# Patient Record
Sex: MALE | Race: WHITE | NOT HISPANIC OR LATINO | Employment: FULL TIME | URBAN - METROPOLITAN AREA
[De-identification: names, ages, dates, MRNs, and addresses within clinical notes are randomized per-mention and may not be internally consistent; named-entity substitution may affect disease eponyms.]

---

## 2018-02-20 ENCOUNTER — APPOINTMENT (OUTPATIENT)
Dept: RADIOLOGY | Facility: MEDICAL CENTER | Age: 63
DRG: 354 | End: 2018-02-20
Attending: EMERGENCY MEDICINE
Payer: COMMERCIAL

## 2018-02-20 ENCOUNTER — HOSPITAL ENCOUNTER (INPATIENT)
Facility: MEDICAL CENTER | Age: 63
LOS: 3 days | DRG: 354 | End: 2018-02-23
Attending: EMERGENCY MEDICINE | Admitting: SURGERY
Payer: COMMERCIAL

## 2018-02-20 DIAGNOSIS — K56.691 OTHER COMPLETE INTESTINAL OBSTRUCTION (HCC): Primary | ICD-10-CM

## 2018-02-20 PROBLEM — K56.609 BOWEL OBSTRUCTION (HCC): Status: ACTIVE | Noted: 2018-02-20

## 2018-02-20 LAB
ALBUMIN SERPL BCP-MCNC: 4.8 G/DL (ref 3.2–4.9)
ALBUMIN/GLOB SERPL: 1.6 G/DL
ALP SERPL-CCNC: 53 U/L (ref 30–99)
ALT SERPL-CCNC: 39 U/L (ref 2–50)
ANION GAP SERPL CALC-SCNC: 14 MMOL/L (ref 0–11.9)
APPEARANCE UR: CLEAR
AST SERPL-CCNC: 30 U/L (ref 12–45)
BASOPHILS # BLD AUTO: 0.5 % (ref 0–1.8)
BASOPHILS # BLD: 0.04 K/UL (ref 0–0.12)
BILIRUB SERPL-MCNC: 0.8 MG/DL (ref 0.1–1.5)
BUN SERPL-MCNC: 25 MG/DL (ref 8–22)
CALCIUM SERPL-MCNC: 10 MG/DL (ref 8.5–10.5)
CHLORIDE SERPL-SCNC: 99 MMOL/L (ref 96–112)
CO2 SERPL-SCNC: 22 MMOL/L (ref 20–33)
COLOR UR AUTO: YELLOW
CREAT SERPL-MCNC: 0.84 MG/DL (ref 0.5–1.4)
EOSINOPHIL # BLD AUTO: 0.06 K/UL (ref 0–0.51)
EOSINOPHIL NFR BLD: 0.8 % (ref 0–6.9)
ERYTHROCYTE [DISTWIDTH] IN BLOOD BY AUTOMATED COUNT: 39.6 FL (ref 35.9–50)
GLOBULIN SER CALC-MCNC: 3 G/DL (ref 1.9–3.5)
GLUCOSE BLD-MCNC: 112 MG/DL (ref 65–99)
GLUCOSE BLD-MCNC: 138 MG/DL (ref 65–99)
GLUCOSE SERPL-MCNC: 220 MG/DL (ref 65–99)
GLUCOSE UR QL STRIP.AUTO: NEGATIVE MG/DL
HCT VFR BLD AUTO: 45 % (ref 42–52)
HGB BLD-MCNC: 16 G/DL (ref 14–18)
IMM GRANULOCYTES # BLD AUTO: 0.04 K/UL (ref 0–0.11)
IMM GRANULOCYTES NFR BLD AUTO: 0.5 % (ref 0–0.9)
INR PPP: 0.95 (ref 0.87–1.13)
KETONES UR QL STRIP.AUTO: ABNORMAL MG/DL
LACTATE BLD-SCNC: 3.6 MMOL/L (ref 0.5–2)
LEUKOCYTE ESTERASE UR QL STRIP.AUTO: NEGATIVE
LIPASE SERPL-CCNC: 13 U/L (ref 11–82)
LYMPHOCYTES # BLD AUTO: 1.89 K/UL (ref 1–4.8)
LYMPHOCYTES NFR BLD: 24.8 % (ref 22–41)
MCH RBC QN AUTO: 30.5 PG (ref 27–33)
MCHC RBC AUTO-ENTMCNC: 35.6 G/DL (ref 33.7–35.3)
MCV RBC AUTO: 85.9 FL (ref 81.4–97.8)
MONOCYTES # BLD AUTO: 0.47 K/UL (ref 0–0.85)
MONOCYTES NFR BLD AUTO: 6.2 % (ref 0–13.4)
NEUTROPHILS # BLD AUTO: 5.12 K/UL (ref 1.82–7.42)
NEUTROPHILS NFR BLD: 67.2 % (ref 44–72)
NITRITE UR QL STRIP.AUTO: NEGATIVE
NRBC # BLD AUTO: 0 K/UL
NRBC BLD-RTO: 0 /100 WBC
PH UR STRIP.AUTO: 6 [PH]
PLATELET # BLD AUTO: 208 K/UL (ref 164–446)
PMV BLD AUTO: 10.9 FL (ref 9–12.9)
POTASSIUM SERPL-SCNC: 4 MMOL/L (ref 3.6–5.5)
PROT SERPL-MCNC: 7.8 G/DL (ref 6–8.2)
PROT UR QL STRIP: 30 MG/DL
PROTHROMBIN TIME: 12.4 SEC (ref 12–14.6)
RBC # BLD AUTO: 5.24 M/UL (ref 4.7–6.1)
RBC UR QL AUTO: NEGATIVE
SODIUM SERPL-SCNC: 135 MMOL/L (ref 135–145)
SP GR UR: 1.01
WBC # BLD AUTO: 7.6 K/UL (ref 4.8–10.8)

## 2018-02-20 PROCEDURE — 700101 HCHG RX REV CODE 250: Performed by: SURGERY

## 2018-02-20 PROCEDURE — 501445 HCHG STAPLER, SKIN DISP: Performed by: SURGERY

## 2018-02-20 PROCEDURE — 160009 HCHG ANES TIME/MIN: Performed by: SURGERY

## 2018-02-20 PROCEDURE — A4450 NON-WATERPROOF TAPE: HCPCS | Performed by: SURGERY

## 2018-02-20 PROCEDURE — 502704 HCHG DEVICE, LIGASURE IMPACT: Performed by: SURGERY

## 2018-02-20 PROCEDURE — 160027 HCHG SURGERY MINUTES - 1ST 30 MINS LEVEL 2: Performed by: SURGERY

## 2018-02-20 PROCEDURE — 700105 HCHG RX REV CODE 258: Performed by: EMERGENCY MEDICINE

## 2018-02-20 PROCEDURE — 81002 URINALYSIS NONAUTO W/O SCOPE: CPT

## 2018-02-20 PROCEDURE — A4314 CATH W/DRAINAGE 2-WAY LATEX: HCPCS | Performed by: SURGERY

## 2018-02-20 PROCEDURE — 700101 HCHG RX REV CODE 250

## 2018-02-20 PROCEDURE — 770006 HCHG ROOM/CARE - MED/SURG/GYN SEMI*

## 2018-02-20 PROCEDURE — 83690 ASSAY OF LIPASE: CPT

## 2018-02-20 PROCEDURE — 96361 HYDRATE IV INFUSION ADD-ON: CPT

## 2018-02-20 PROCEDURE — 700102 HCHG RX REV CODE 250 W/ 637 OVERRIDE(OP)

## 2018-02-20 PROCEDURE — 99291 CRITICAL CARE FIRST HOUR: CPT

## 2018-02-20 PROCEDURE — 85610 PROTHROMBIN TIME: CPT

## 2018-02-20 PROCEDURE — 74177 CT ABD & PELVIS W/CONTRAST: CPT

## 2018-02-20 PROCEDURE — 160003 HCHG RECOVERY MINUTES (EXTENDED) STAT: Performed by: SURGERY

## 2018-02-20 PROCEDURE — 160048 HCHG OR STATISTICAL LEVEL 1-5: Performed by: SURGERY

## 2018-02-20 PROCEDURE — 36415 COLL VENOUS BLD VENIPUNCTURE: CPT

## 2018-02-20 PROCEDURE — 501838 HCHG SUTURE GENERAL: Performed by: SURGERY

## 2018-02-20 PROCEDURE — 160038 HCHG SURGERY MINUTES - EA ADDL 1 MIN LEVEL 2: Performed by: SURGERY

## 2018-02-20 PROCEDURE — 87040 BLOOD CULTURE FOR BACTERIA: CPT | Mod: 91

## 2018-02-20 PROCEDURE — 94760 N-INVAS EAR/PLS OXIMETRY 1: CPT

## 2018-02-20 PROCEDURE — 0WQF0ZZ REPAIR ABDOMINAL WALL, OPEN APPROACH: ICD-10-PCS | Performed by: SURGERY

## 2018-02-20 PROCEDURE — 700102 HCHG RX REV CODE 250 W/ 637 OVERRIDE(OP): Performed by: SURGERY

## 2018-02-20 PROCEDURE — 96374 THER/PROPH/DIAG INJ IV PUSH: CPT

## 2018-02-20 PROCEDURE — 160035 HCHG PACU - 1ST 60 MINS PHASE I: Performed by: SURGERY

## 2018-02-20 PROCEDURE — 160036 HCHG PACU - EA ADDL 30 MINS PHASE I: Performed by: SURGERY

## 2018-02-20 PROCEDURE — 96375 TX/PRO/DX INJ NEW DRUG ADDON: CPT

## 2018-02-20 PROCEDURE — 700111 HCHG RX REV CODE 636 W/ 250 OVERRIDE (IP): Performed by: EMERGENCY MEDICINE

## 2018-02-20 PROCEDURE — 85025 COMPLETE CBC W/AUTO DIFF WBC: CPT

## 2018-02-20 PROCEDURE — 500122 HCHG BOVIE, BLADE: Performed by: SURGERY

## 2018-02-20 PROCEDURE — A9270 NON-COVERED ITEM OR SERVICE: HCPCS

## 2018-02-20 PROCEDURE — 500698 HCHG HEMOCLIP, MEDIUM: Performed by: SURGERY

## 2018-02-20 PROCEDURE — 82962 GLUCOSE BLOOD TEST: CPT

## 2018-02-20 PROCEDURE — 80053 COMPREHEN METABOLIC PANEL: CPT

## 2018-02-20 PROCEDURE — A9270 NON-COVERED ITEM OR SERVICE: HCPCS | Performed by: SURGERY

## 2018-02-20 PROCEDURE — 160002 HCHG RECOVERY MINUTES (STAT): Performed by: SURGERY

## 2018-02-20 PROCEDURE — 83605 ASSAY OF LACTIC ACID: CPT

## 2018-02-20 PROCEDURE — 88300 SURGICAL PATH GROSS: CPT

## 2018-02-20 PROCEDURE — 700117 HCHG RX CONTRAST REV CODE 255: Performed by: EMERGENCY MEDICINE

## 2018-02-20 PROCEDURE — 700111 HCHG RX REV CODE 636 W/ 250 OVERRIDE (IP)

## 2018-02-20 RX ORDER — HYDRALAZINE HYDROCHLORIDE 20 MG/ML
INJECTION INTRAMUSCULAR; INTRAVENOUS
Status: COMPLETED
Start: 2018-02-20 | End: 2018-02-20

## 2018-02-20 RX ORDER — ACETAMINOPHEN 325 MG/1
650 TABLET ORAL EVERY 6 HOURS PRN
Status: DISCONTINUED | OUTPATIENT
Start: 2018-02-20 | End: 2018-02-23 | Stop reason: HOSPADM

## 2018-02-20 RX ORDER — SODIUM CHLORIDE AND POTASSIUM CHLORIDE 150; 900 MG/100ML; MG/100ML
INJECTION, SOLUTION INTRAVENOUS CONTINUOUS
Status: DISCONTINUED | OUTPATIENT
Start: 2018-02-20 | End: 2018-02-23 | Stop reason: HOSPADM

## 2018-02-20 RX ORDER — ONDANSETRON 4 MG/1
4 TABLET, ORALLY DISINTEGRATING ORAL EVERY 4 HOURS PRN
Status: DISCONTINUED | OUTPATIENT
Start: 2018-02-20 | End: 2018-02-23 | Stop reason: HOSPADM

## 2018-02-20 RX ORDER — PROMETHAZINE HYDROCHLORIDE 25 MG/1
12.5-25 SUPPOSITORY RECTAL EVERY 4 HOURS PRN
Status: DISCONTINUED | OUTPATIENT
Start: 2018-02-20 | End: 2018-02-23 | Stop reason: HOSPADM

## 2018-02-20 RX ORDER — PROMETHAZINE HYDROCHLORIDE 25 MG/1
12.5-25 TABLET ORAL EVERY 4 HOURS PRN
Status: DISCONTINUED | OUTPATIENT
Start: 2018-02-20 | End: 2018-02-23 | Stop reason: HOSPADM

## 2018-02-20 RX ORDER — ONDANSETRON 2 MG/ML
4 INJECTION INTRAMUSCULAR; INTRAVENOUS ONCE
Status: COMPLETED | OUTPATIENT
Start: 2018-02-20 | End: 2018-02-20

## 2018-02-20 RX ORDER — SODIUM CHLORIDE 9 MG/ML
1000 INJECTION, SOLUTION INTRAVENOUS ONCE
Status: COMPLETED | OUTPATIENT
Start: 2018-02-20 | End: 2018-02-20

## 2018-02-20 RX ORDER — OXYCODONE HYDROCHLORIDE 5 MG/1
2.5 TABLET ORAL
Status: DISCONTINUED | OUTPATIENT
Start: 2018-02-20 | End: 2018-02-23 | Stop reason: HOSPADM

## 2018-02-20 RX ORDER — OMEPRAZOLE 20 MG/1
40 CAPSULE, DELAYED RELEASE ORAL DAILY
COMMUNITY

## 2018-02-20 RX ORDER — BISACODYL 10 MG
10 SUPPOSITORY, RECTAL RECTAL
Status: DISCONTINUED | OUTPATIENT
Start: 2018-02-20 | End: 2018-02-23 | Stop reason: HOSPADM

## 2018-02-20 RX ORDER — OXYCODONE HYDROCHLORIDE 5 MG/1
5 TABLET ORAL
Status: DISCONTINUED | OUTPATIENT
Start: 2018-02-20 | End: 2018-02-23 | Stop reason: HOSPADM

## 2018-02-20 RX ORDER — ONDANSETRON 2 MG/ML
4 INJECTION INTRAMUSCULAR; INTRAVENOUS EVERY 4 HOURS PRN
Status: DISCONTINUED | OUTPATIENT
Start: 2018-02-20 | End: 2018-02-23 | Stop reason: HOSPADM

## 2018-02-20 RX ORDER — OMEPRAZOLE 20 MG/1
40 CAPSULE, DELAYED RELEASE ORAL DAILY
Status: DISCONTINUED | OUTPATIENT
Start: 2018-02-21 | End: 2018-02-23 | Stop reason: HOSPADM

## 2018-02-20 RX ORDER — OXYCODONE HCL 5 MG/5 ML
SOLUTION, ORAL ORAL
Status: COMPLETED
Start: 2018-02-20 | End: 2018-02-20

## 2018-02-20 RX ORDER — MORPHINE SULFATE 10 MG/ML
8 INJECTION, SOLUTION INTRAMUSCULAR; INTRAVENOUS ONCE
Status: COMPLETED | OUTPATIENT
Start: 2018-02-20 | End: 2018-02-20

## 2018-02-20 RX ORDER — MORPHINE SULFATE 4 MG/ML
2 INJECTION, SOLUTION INTRAMUSCULAR; INTRAVENOUS
Status: DISCONTINUED | OUTPATIENT
Start: 2018-02-20 | End: 2018-02-23 | Stop reason: HOSPADM

## 2018-02-20 RX ORDER — AMOXICILLIN 250 MG
2 CAPSULE ORAL 2 TIMES DAILY
Status: DISCONTINUED | OUTPATIENT
Start: 2018-02-20 | End: 2018-02-23 | Stop reason: HOSPADM

## 2018-02-20 RX ORDER — POLYETHYLENE GLYCOL 3350 17 G/17G
1 POWDER, FOR SOLUTION ORAL
Status: DISCONTINUED | OUTPATIENT
Start: 2018-02-20 | End: 2018-02-23 | Stop reason: HOSPADM

## 2018-02-20 RX ORDER — MIDAZOLAM HYDROCHLORIDE 1 MG/ML
INJECTION INTRAMUSCULAR; INTRAVENOUS
Status: DISPENSED
Start: 2018-02-20 | End: 2018-02-20

## 2018-02-20 RX ORDER — LABETALOL HYDROCHLORIDE 5 MG/ML
INJECTION, SOLUTION INTRAVENOUS
Status: COMPLETED
Start: 2018-02-20 | End: 2018-02-20

## 2018-02-20 RX ORDER — LISINOPRIL 20 MG/1
20 TABLET ORAL DAILY
COMMUNITY

## 2018-02-20 RX ORDER — LISINOPRIL 20 MG/1
20 TABLET ORAL DAILY
Status: DISCONTINUED | OUTPATIENT
Start: 2018-02-20 | End: 2018-02-23 | Stop reason: HOSPADM

## 2018-02-20 RX ORDER — LABETALOL HYDROCHLORIDE 5 MG/ML
10 INJECTION, SOLUTION INTRAVENOUS EVERY 4 HOURS PRN
Status: DISCONTINUED | OUTPATIENT
Start: 2018-02-20 | End: 2018-02-23 | Stop reason: HOSPADM

## 2018-02-20 RX ADMIN — HYDROMORPHONE HYDROCHLORIDE 1 MG: 10 INJECTION, SOLUTION INTRAMUSCULAR; INTRAVENOUS; SUBCUTANEOUS at 05:02

## 2018-02-20 RX ADMIN — ONDANSETRON 4 MG: 2 INJECTION INTRAMUSCULAR; INTRAVENOUS at 05:02

## 2018-02-20 RX ADMIN — POTASSIUM CHLORIDE AND SODIUM CHLORIDE: 900; 150 INJECTION, SOLUTION INTRAVENOUS at 16:19

## 2018-02-20 RX ADMIN — IOHEXOL 100 ML: 350 INJECTION, SOLUTION INTRAVENOUS at 06:01

## 2018-02-20 RX ADMIN — OXYCODONE HYDROCHLORIDE 2.5 MG: 5 TABLET ORAL at 20:54

## 2018-02-20 RX ADMIN — TAZOBACTAM SODIUM AND PIPERACILLIN SODIUM 4.5 G: 500; 4 INJECTION, SOLUTION INTRAVENOUS at 06:55

## 2018-02-20 RX ADMIN — HYDRALAZINE HYDROCHLORIDE 10 MG: 20 INJECTION INTRAMUSCULAR; INTRAVENOUS at 10:05

## 2018-02-20 RX ADMIN — MORPHINE SULFATE 8 MG: 10 INJECTION INTRAVENOUS at 05:40

## 2018-02-20 RX ADMIN — STANDARDIZED SENNA CONCENTRATE AND DOCUSATE SODIUM 2 TABLET: 8.6; 5 TABLET, FILM COATED ORAL at 20:46

## 2018-02-20 RX ADMIN — LABETALOL HYDROCHLORIDE 10 MG: 5 INJECTION, SOLUTION INTRAVENOUS at 09:40

## 2018-02-20 RX ADMIN — OXYCODONE HYDROCHLORIDE 10 MG: 5 SOLUTION ORAL at 09:55

## 2018-02-20 RX ADMIN — LISINOPRIL 20 MG: 20 TABLET ORAL at 16:19

## 2018-02-20 RX ADMIN — SODIUM CHLORIDE 1000 ML: 9 INJECTION, SOLUTION INTRAVENOUS at 05:40

## 2018-02-20 RX ADMIN — LABETALOL HYDROCHLORIDE 10 MG: 5 INJECTION, SOLUTION INTRAVENOUS at 09:50

## 2018-02-20 ASSESSMENT — PAIN SCALES - GENERAL
PAINLEVEL_OUTOF10: 0
PAINLEVEL_OUTOF10: 0
PAINLEVEL_OUTOF10: ASSUMED PAIN PRESENT
PAINLEVEL_OUTOF10: 0
PAINLEVEL_OUTOF10: 10
PAINLEVEL_OUTOF10: 0
PAINLEVEL_OUTOF10: 5

## 2018-02-20 ASSESSMENT — COPD QUESTIONNAIRES
DO YOU EVER COUGH UP ANY MUCUS OR PHLEGM?: NO/ONLY WITH OCCASIONAL COLDS OR INFECTIONS
HAVE YOU SMOKED AT LEAST 100 CIGARETTES IN YOUR ENTIRE LIFE: NO/DON'T KNOW
COPD SCREENING SCORE: 2
DURING THE PAST 4 WEEKS HOW MUCH DID YOU FEEL SHORT OF BREATH: NONE/LITTLE OF THE TIME

## 2018-02-20 ASSESSMENT — PATIENT HEALTH QUESTIONNAIRE - PHQ9
2. FEELING DOWN, DEPRESSED, IRRITABLE, OR HOPELESS: NOT AT ALL
1. LITTLE INTEREST OR PLEASURE IN DOING THINGS: NOT AT ALL
SUM OF ALL RESPONSES TO PHQ9 QUESTIONS 1 AND 2: 0
SUM OF ALL RESPONSES TO PHQ QUESTIONS 1-9: 0

## 2018-02-20 ASSESSMENT — LIFESTYLE VARIABLES
ALCOHOL_USE: NO
EVER_SMOKED: YES

## 2018-02-20 NOTE — ED PROVIDER NOTES
"ED Provider Note    Scribed for Rosibel Stockton M.D. by Anthony Eric. 2/20/2018, 4:49 AM.    Primary care provider: None noted  Means of arrival: Walk-In  History obtained from: Patient  History limited by: None    CHIEF COMPLAINT  Chief Complaint   Patient presents with   • Hernia     Hernia present for \"years\" increasing in pain tonight   • N/V     HPI  Venancio Alcaraz is a 62 y.o. male who presents to the Emergency Department complaining of \"severe\" pain abdominal pain onset tonight. The past has had an umbilical hernia which has been present for years. He felt completely fine today without any issues and slept around 9:00 PM. However, he awoke nauseous in the middle of the night with severe pain to the area. Patient notes there is some color and size difference compared to how the hernia usually is. Denies chest pain, difficulty breathing, constipation. He endorses using a baby asprin but is not on any other blood thinners.    REVIEW OF SYSTEMS  See HPI for further details. All other systems are negative.    C.    PAST MEDICAL HISTORY   has a past medical history of Diabetes ([facility]) and Hypertension.    SURGICAL HISTORY  patient denies any surgical history    SOCIAL HISTORY  None noted     FAMILY HISTORY  History reviewed. No pertinent family history.    CURRENT MEDICATIONS  Reviewed. See Encounter Summary.     ALLERGIES  No Known Allergies    PHYSICAL EXAM  VITAL SIGNS: BP (!) 189/139 Comment: Unable to stay still/relax arm to take accurate BP  Pulse 85   Resp 18   Ht 1.676 m (5' 6\")   Wt 103 kg (227 lb)   SpO2 100%   BMI 36.64 kg/m²    Pulse ox interpretation: I interpret this pulse ox as normal.  Constitutional: Alert in severe apparent distress. Writhing in discomfort.  HENT: Normocephalic atraumatic, MMM  Eyes: PERR, Conjunctiva normal, Non-icteric.   Neck: Normal range of motion, No tenderness, Supple, No stridor.   Lymphatic: No lymphadenopathy noted.   Cardiovascular: Regular rate and " rhythm, no murmurs.   Thorax & Lungs: Normal breath sounds, No respiratory distress, No wheezing, No chest tenderness.   Abdomen: Bowel sounds normal, Soft, Umbilical hernia that non-reducible, Hernia is purple, dusky, with discoloration.  Skin: Warm, Dry, No erythema, No rash.   Back: No bony tenderness, No CVA tenderness.   Extremities: Intact distal pulses, No edema, No tenderness, No cyanosis  Musculoskeletal: Good range of motion in all major joints. No tenderness to palpation or major deformities noted.   Neurologic: Alert and oriented, No focal deficits noted.     DIFFERENTIAL DIAGNOSIS AND WORK UP PLAN    4:49 AM Patient seen and examined at bedside. The patient presents with hernia pain and the differential diagnosis includes but is not limited to Incarcerated vs Strangulated umbilical hernia vs Incarcerated omentum. Ordered for CT-Abdomen, PT/INR, Lactic Acid, CBC, CMP, Lipase, POC Urinalysis to evaluate. Patient will be treated with 4mg Zofran, 1mg Dilaudid for his symptoms.     DIAGNOSTIC STUDIES / PROCEDURES     LABS   Normal CBC and CMP with an elevated lactic acid concerning for bowel incarceration and strangulation, blood culture sent  All labs were reviewed by me.    RADIOLOGY  CT-ABDOMEN-PELVIS WITH   Final Result      1.  There are 2 adjacent ventral abdominal wall hernias just above the umbilicus. The more inferior hernia contains a loop of small bowel with induration of the fat. There is mild dilatation of a proximal small bowel loop within the abdomen with nodular    areas of enhancement along the wall. Findings are suspicious for an incarcerated hernia.   2.  The 2nd hernia demonstrates some indurated fat but no bowel loops.   3.  There is diffuse hepatic fatty infiltration.   4.  There is a hiatal hernia.        The radiologist's interpretation of all radiological studies have been reviewed by me.    COURSE & MEDICAL DECISION MAKING  Pertinent Labs & Imaging studies reviewed. (See chart for  "details)    6:04 AM - Consulted with Dr. Reyes, General Surgery, who is aware of the patient and agrees to come see the patient at the bedside    The patient was started on Zosyn for concern for strangulated abdominal hernia. He is doing better after Dilaudid and IV morphine and understands the plan for admission and is currently nothing by mouth for surgery this afternoon. He was given IV fluid hydration for his lactic acidosis and concern for bowel strangulation.    /106   Pulse 83   Resp 15   Ht 1.676 m (5' 6\")   Wt 103 kg (227 lb)   SpO2 98%   BMI 36.64 kg/m²       CRITICAL CARE  The very real possibilty of a deterioration of this patient's condition required the highest level of my preparedness for sudden, emergent intervention.  I provided critical care services, which included medication orders, frequent reevaluations of the patient's condition and response to treatment, ordering and reviewing test results, and discussing the case with various consultants.  The critical care time associated with the care of the patient was 35 minutes. Review chart for interventions. This time is exclusive of any other billable procedures.     DISPOSITION:  Patient will be admitted to Dr. Reyes, general surgeon, in critical condition.    FINAL IMPRESSION  1. Incarcerated ventral hernia  2. Severe abdominal pain  3. Nausea and vomiting  4. Lactic acidosis     Critical Care Time of 35 minutes. This time is exclusive of any other billable procedures.    Anthony DOUGHERTY (Malcolm), am scribing for, and in the presence of, Rosibel Stockton M.D..    Electronically signed by: Anthony Mccoy), 2/20/2018    Rosibel DOUGHERTY M.D. personally performed the services described in this documentation, as scribed by Anthony Eric in my presence, and it is both accurate and complete.    The note accurately reflects work and decisions made by me.  Rosibel Stockton  2/20/2018  7:09 AM    This dictation has been " created using voice recognition software and/or scribes. The accuracy of the dictation is limited by the abilities of the software and the expertise of the scribes. I expect there may be some errors of grammar and possibly content. I made every attempt to manually correct the errors within my dictation. However, errors related to voice recognition software and/or scribes may still exist and should be interpreted within the appropriate context.

## 2018-02-20 NOTE — ED TRIAGE NOTES
"Venancio Alcaraz    Chief Complaint   Patient presents with   • Hernia     Hernia present for \"years\" increasing in pain tonight   • N/V       Pt ambulatory to triage with above complaint. Pt started to have increasing pain around hernia tonight~midnight.  +1 episode of emesis +pt restless and appears to be in discomfort in triage. Pain does not radiate PMH: HTN, DM. VSS.    Charge RN aware of pt, pt roomed immediately.      Blood Pressure: (!) 189/139 (Unable to stay still/relax arm to take accurate BP), Pulse: 85, Respiration: 18, Temperature:  (Unable to obtain. Pt too cold at the moment), Height: 167.6 cm (5' 6\"), Weight: 103 kg (227 lb), Pulse Oximetry: 100 %      "

## 2018-02-20 NOTE — PROGRESS NOTES
Date & Time:   2/20/2018   7:35 AM        Patient ID:             Name:             Venancio Alcaraz   YOB: 1955  Age:                 62 y.o.  male   MRN:               [id number]    ___________________     Interval Exam:       Vitals:    02/20/18 0530 02/20/18 0605 02/20/18 0630 02/20/18 0701   BP:       Pulse: 89 88 84 83   Resp: 17 16 18 15   SpO2: 92% 97% 98% 98%   Weight:       Height:         Weight/BMI: Body mass index is 36.64 kg/m².  Pulse Oximetry: 98 %, O2 (LPM): 2, O2 Delivery: Nasal Cannula  No intake or output data in the 24 hours ending 02/20/18 0735          Recent Labs      02/20/18   0441   SODIUM  135   POTASSIUM  4.0   CHLORIDE  99   CO2  22   BUN  25*   CREATININE  0.84   CALCIUM  10.0       Recent Labs      02/20/18   0441   ALTSGPT  39   ASTSGOT  30   ALKPHOSPHAT  53   TBILIRUBIN  0.8   LIPASE  13   GLUCOSE  220*       Recent Labs      02/20/18   0441   RBC  5.24   HEMOGLOBIN  16.0   HEMATOCRIT  45.0   PLATELETCT  208   PROTHROMBTM  12.4   INR  0.95       Recent Labs      02/20/18   0441   WBC  7.6   NEUTSPOLYS  67.20   LYMPHOCYTES  24.80   MONOCYTES  6.20   EOSINOPHILS  0.80   BASOPHILS  0.50   ASTSGOT  30   ALTSGPT  39   ALKPHOSPHAT  53   TBILIRUBIN  0.8     H&P dictated  Patient with large umbilical hernia  C/o pain and swelling at hernia site x 1 day  He reports severe pain and nausea  Unable to reduce in ED  CT abdomen/pelvis shows small bowel loops within hernia    Concern is for pending strangulation  Plan to proceed with Open repair umbilical hernia possible bowel resection.  Risks of surgery discussed with patient; infection, bleeding, anastomotic leak resulting in abscess/fistula  Bowel obstruction and chronic pain and recurrence of hernia.  Patient stated he understands risks of surgery and wishes to proceed.

## 2018-02-20 NOTE — OP REPORT
DATE OF SERVICE:  02/20/2018    PREOPERATIVE DIAGNOSIS:  Incarcerated umbilical hernia containing bowel.    POSTOPERATIVE DIAGNOSIS:  Incarcerated umbilical hernia, small ventral hernia.    PROCEDURE:  Open repair of umbilical hernia and reduction of small bowel and   omentum.    SURGEON:  Minna Reyes MD    ANESTHESIOLOGIST:  Guero Narvaez MD    ANESTHESIA:  GET.    SPECIMENS:  Hernia and sac.    BLOOD LOSS:  20 mL    FINDINGS:  Congested segment of small bowel, but viable turbid ascites within   the hernia sac and umbilical defect 4 cm with a 2 cm defect superior   containing the omentum.    COMPLICATIONS:  None.    PROCEDURE IN DETAIL:  The patient was consented preoperatively, taken to   operating room, and placed in a supine position.  Anesthesia was induced.  He   was endotracheally intubated without difficulty.  Montelongo catheter was placed.    His abdomen was prepped and draped.  The patient's umbilicus had been   obliterated by the incarcerated hernia with the skin, thinned and showing   early signs of necrosis.  The overlying skin was excised and extended   superiorly for a midline incision.  The incision was deepened down to the   level of the fascia superior to the hernia defect so that the abdomen could be   opened away from the hernia.  The fascial incision was then extended   inferiorly connecting a 2 cm ventral defect with the umbilical hernia, which   was approximately 4 cm.  The small bowel loop was able to be reduced after the   defects were both opened.  It was found to be a 6 cm segment that was   congested.  There was venous thrombosis in the mesentery and ecchymosis, but   the bowel after a few minutes appeared viable with peristalsis.  No signs of   necrosis.  The abdomen was then irrigated out copiously.  Hemostasis was   achieved.  The hernia sac excised from the surrounding fascia and the specimen   sent with the umbilical skin.  Decision was made to not place the mesh, as   the ascites  appeared turbid with the defects connected and the subcutaneous   fat overlying the fascia elevated.  The fascia came to the midline with little   to no tension.  The fascia was then closed with interrupted 1-0 Vicryl   sutures, Kay's fascia closed with interrupted 3-0 Vicryl, and the skin   closed with surgical staples.  Dry sterile gauze dressing was then applied.    All lap, sponge, and instrument counts were correct at the end of the case x2.    The patient tolerated the procedure well.  He was awakened from anesthesia,   extubated, taken to postanesthesia care unit in stable condition.       ____________________________________     MD PARAMJIT Dyson / JADA    DD:  02/20/2018 09:32:10  DT:  02/20/2018 09:55:43    D#:  [id number]  Job#:  [id number]

## 2018-02-20 NOTE — H&P
REASON FOR CONSULTATION:  Incarcerated umbilical hernia.    HISTORY OF PRESENT ILLNESS:  Patient is a 62-year-old male.  He has had a   large umbilical hernia for many years, but reports he has not had any issues.    Overnight, he was woken up with severe pain and tenderness around the hernia   site with associated nausea and vomiting. Patient stated the pain is 8/10   worse with coughing and movement. Non-radiating pain with no alleviating factors.  Patient denies any history of hematemesis or   hematochezia.  Denies shortness of breath or chest pain.    REVIEW OF SYSTEMS:  A 10-point review except for that stated in the HPI or   negative.    PAST MEDICAL HISTORY:  Patient denies.    MEDICATIONS:  Baby aspirin medical history of hypertension.    SURGERIES:  Denies.    SOCIAL HISTORY:  Denies tobacco or illicit drugs.    ALLERGIES:  No known drug allergies.    PHYSICAL EXAMINATION:  VITAL SIGNS:  The patient's temperature 36.6, pulse of 83, blood pressure   189/139, sats 92% on room air.  GENERAL:  Patient is alert and oriented x3, no apparent distress.  CARDIOVASCULAR:  Regular rate and rhythm.  EXTREMITIES:  Warm.  No edema.  LUNGS:  Clear to auscultation.  ABDOMEN:  Soft, distended.  He has a large umbilical hernia that is not   reducible, defect measuring at least 4 cm in size.  The overlying skin has   severe thinning of the dermis with venous congestion.  No rebound or guarding   present.  No fluid wave.  SKIN:  Warm.  There is no erythema or rashes.  NEUROLOGIC:  Cranial nerves II-XII grossly intact.  Normal sensation, strength   in bilateral upper and lower extremities.    IMAGING:  CT scan shows a large umbilical hernia with small intestine loops   present within the hernia and also a diffuse hepatic low attenuation, spleen,   pancreas unremarkable, nonobstructive bilateral renal stones.  No   lymphadenopathy.  There is a fat within the hernia as well as mild bowel wall   thickening and mucosal  enhancement.    LABORATORY DATA:  White count of 7.6, hemoglobin 16, hematocrit 45, platelets   208.  Sodium 135, potassium 4.0, chloride was 99, bicarbonate 22, BUN of 25,   creatinine 0.8, AST 30, ALT 39, alkaline phosphatase 53, total bilirubin 0.8,   lactic acid of 3.6.    ASSESSMENT AND PLAN:  A 62-year-old male with incarcerated umbilical hernia   containing bowel.  Patient is having significant pain and signs of early   obstruction and a slight tachycardia, lactic acidosis, concerning for   impending strangulation.  Plan is to take patient to surgery for open   umbilical hernia repair, possible bowel resection, possible mesh.  Risks of   surgery were discussed with the patient including Postoperative infection,   bleeding, injury to bowel anastomotic leak resulting in abscess suspicious for   admission and need for further surgeries.  Also, discussed that I might be   able to utilize mesh should there be a contamination or strangulated bowel and   if this should occur and there is an increased chance of recurrence.  He also   reported chronic pain from mesh.  He understands the risks, indications for   surgery.       ____________________________________     MD PARAMJIT Dyson / JADA    DD:  02/20/2018 08:22:22  DT:  02/20/2018 09:50:58    D#:  [id number]  Job#:  [id number]

## 2018-02-20 NOTE — OR SURGEON
Immediate Post OP Note    PreOp Diagnosis: Incarcerated Umbilical hernia    PostOp Diagnosis: Incarcerated Umbilical Hernia, and small ventral hernia    Procedure(s):  UMBILICAL HERNIA REPAIR - Wound Class: Clean    Surgeon(s):  Minna Reyes M.D.    Anesthesiologist/Type of Anesthesia:  Anesthesiologist: Guero Narvaez M.D./General    Surgical Staff:  Circulator: Nina Mock R.N.  Scrub Person: Marisol Braun    Specimens:  Hernia and sac    Estimated Blood Loss: 20cc    Findings: congested segment small bowel but viable, turbid ascites within hernia sac  Umbilical defect 4cm with 2cm defect superior containing omentum    Complications: none known    #[id number]        2/20/2018 9:26 AM Minna Reyes

## 2018-02-21 LAB
ANION GAP SERPL CALC-SCNC: 7 MMOL/L (ref 0–11.9)
BASOPHILS # BLD AUTO: 0.2 % (ref 0–1.8)
BASOPHILS # BLD: 0.02 K/UL (ref 0–0.12)
BUN SERPL-MCNC: 15 MG/DL (ref 8–22)
CALCIUM SERPL-MCNC: 8.5 MG/DL (ref 8.5–10.5)
CHLORIDE SERPL-SCNC: 104 MMOL/L (ref 96–112)
CO2 SERPL-SCNC: 25 MMOL/L (ref 20–33)
CREAT SERPL-MCNC: 0.67 MG/DL (ref 0.5–1.4)
EOSINOPHIL # BLD AUTO: 0.01 K/UL (ref 0–0.51)
EOSINOPHIL NFR BLD: 0.1 % (ref 0–6.9)
ERYTHROCYTE [DISTWIDTH] IN BLOOD BY AUTOMATED COUNT: 41.7 FL (ref 35.9–50)
GLUCOSE SERPL-MCNC: 128 MG/DL (ref 65–99)
HCT VFR BLD AUTO: 42.5 % (ref 42–52)
HGB BLD-MCNC: 14.4 G/DL (ref 14–18)
IMM GRANULOCYTES # BLD AUTO: 0.04 K/UL (ref 0–0.11)
IMM GRANULOCYTES NFR BLD AUTO: 0.4 % (ref 0–0.9)
LYMPHOCYTES # BLD AUTO: 1.88 K/UL (ref 1–4.8)
LYMPHOCYTES NFR BLD: 17.6 % (ref 22–41)
MCH RBC QN AUTO: 29.5 PG (ref 27–33)
MCHC RBC AUTO-ENTMCNC: 33.9 G/DL (ref 33.7–35.3)
MCV RBC AUTO: 87.1 FL (ref 81.4–97.8)
MONOCYTES # BLD AUTO: 1.05 K/UL (ref 0–0.85)
MONOCYTES NFR BLD AUTO: 9.8 % (ref 0–13.4)
NEUTROPHILS # BLD AUTO: 7.7 K/UL (ref 1.82–7.42)
NEUTROPHILS NFR BLD: 71.9 % (ref 44–72)
NRBC # BLD AUTO: 0 K/UL
NRBC BLD-RTO: 0 /100 WBC
PLATELET # BLD AUTO: 211 K/UL (ref 164–446)
PMV BLD AUTO: 10.9 FL (ref 9–12.9)
POTASSIUM SERPL-SCNC: 4 MMOL/L (ref 3.6–5.5)
RBC # BLD AUTO: 4.88 M/UL (ref 4.7–6.1)
SODIUM SERPL-SCNC: 136 MMOL/L (ref 135–145)
WBC # BLD AUTO: 10.7 K/UL (ref 4.8–10.8)

## 2018-02-21 PROCEDURE — 700101 HCHG RX REV CODE 250: Performed by: SURGERY

## 2018-02-21 PROCEDURE — 700102 HCHG RX REV CODE 250 W/ 637 OVERRIDE(OP): Performed by: SURGERY

## 2018-02-21 PROCEDURE — 770006 HCHG ROOM/CARE - MED/SURG/GYN SEMI*

## 2018-02-21 PROCEDURE — 80048 BASIC METABOLIC PNL TOTAL CA: CPT

## 2018-02-21 PROCEDURE — 85025 COMPLETE CBC W/AUTO DIFF WBC: CPT

## 2018-02-21 PROCEDURE — 700111 HCHG RX REV CODE 636 W/ 250 OVERRIDE (IP): Performed by: SURGERY

## 2018-02-21 PROCEDURE — A9270 NON-COVERED ITEM OR SERVICE: HCPCS | Performed by: SURGERY

## 2018-02-21 PROCEDURE — 36415 COLL VENOUS BLD VENIPUNCTURE: CPT

## 2018-02-21 RX ADMIN — ENOXAPARIN SODIUM 40 MG: 100 INJECTION SUBCUTANEOUS at 11:28

## 2018-02-21 RX ADMIN — OXYCODONE HYDROCHLORIDE 5 MG: 5 TABLET ORAL at 11:28

## 2018-02-21 RX ADMIN — OMEPRAZOLE 40 MG: 20 CAPSULE, DELAYED RELEASE ORAL at 08:23

## 2018-02-21 RX ADMIN — POTASSIUM CHLORIDE AND SODIUM CHLORIDE: 900; 150 INJECTION, SOLUTION INTRAVENOUS at 01:06

## 2018-02-21 RX ADMIN — STANDARDIZED SENNA CONCENTRATE AND DOCUSATE SODIUM 2 TABLET: 8.6; 5 TABLET, FILM COATED ORAL at 22:10

## 2018-02-21 RX ADMIN — OXYCODONE HYDROCHLORIDE 2.5 MG: 5 TABLET ORAL at 22:09

## 2018-02-21 RX ADMIN — LISINOPRIL 20 MG: 20 TABLET ORAL at 08:23

## 2018-02-21 RX ADMIN — POTASSIUM CHLORIDE AND SODIUM CHLORIDE: 900; 150 INJECTION, SOLUTION INTRAVENOUS at 18:21

## 2018-02-21 RX ADMIN — OXYCODONE HYDROCHLORIDE 5 MG: 5 TABLET ORAL at 15:54

## 2018-02-21 RX ADMIN — STANDARDIZED SENNA CONCENTRATE AND DOCUSATE SODIUM 2 TABLET: 8.6; 5 TABLET, FILM COATED ORAL at 08:23

## 2018-02-21 ASSESSMENT — PAIN SCALES - GENERAL
PAINLEVEL_OUTOF10: 5
PAINLEVEL_OUTOF10: 5
PAINLEVEL_OUTOF10: 8
PAINLEVEL_OUTOF10: 2
PAINLEVEL_OUTOF10: 4
PAINLEVEL_OUTOF10: 3

## 2018-02-21 NOTE — CARE PLAN
Problem: Safety  Goal: Will remain free from injury  Outcome: PROGRESSING AS EXPECTED  Safety precautions in place. Call light within reach. Bed is low and in locked position. Hourly rounding in place.     Problem: Bowel/Gastric:  Goal: Normal bowel function is maintained or improved  Outcome: PROGRESSING AS EXPECTED  Pt has midline abd incision. Educated the pt on splinting during coughing and getting up. Pt verbalized understanding.     Problem: Urinary Elimination:  Goal: Ability to reestablish a normal urinary elimination pattern will improve  Outcome: PROGRESSING AS EXPECTED  Pt has Montelongo in place.

## 2018-02-21 NOTE — CARE PLAN
Problem: Safety  Goal: Will remain free from injury  Outcome: PROGRESSING AS EXPECTED  Fall precautions maintained. Montelongo in place. Call light and bedside table within reach.

## 2018-02-21 NOTE — CARE PLAN
Problem: Pain Management  Goal: Pain level will decrease to patient's comfort goal  Outcome: PROGRESSING AS EXPECTED  Patient c/o pain to abd when couging. Pillow given to splint for pain. Oxycodone 2.5 mg PRN Q4 given for pain and pain controlled. IS at bedside and patient encouraged to use.

## 2018-02-21 NOTE — OR NURSING
Pt A&OX4. VSS. BP stable post Labetalol IV and Hydralazine IV administration in PACU. Pt has no complaints of pain post pain medication administration, does state come pain with cough and instructed to hold pillow to abd when coughing. Pt has no complaints of nausea, tolerated sips of water and clear liquid diet. Report called to ANGEL Gordon. Pt out of PACU with transport.

## 2018-02-21 NOTE — PROGRESS NOTES
Pt arrived to the unit with the transporter via gurney.     2 RN skin check done with Charlotte ORTIZ. Pt has abdominal dressing in place. Clean, dry and intact. An abrasion noted by the left elbow. No other skin breakdown noted.     Safety precautions in place. Call light within reach. Bed in low and locked position. Hourly rounding in place. Updated on plan of care. Questions answered.

## 2018-02-21 NOTE — PROGRESS NOTES
Pt is AAO x4  Declines any pain or discomfort at this time.  VS WNL.  Midline abd dressing in place with old scant drainage.  R PIV patent, running fluids.  SCDs in place.   Montelongo in place, draining properly.  POC discussed.  All needs met at this time.  Bed in low position.  Call light within reach.  Rounding in place.

## 2018-02-21 NOTE — CARE PLAN
Problem: Venous Thromboembolism (VTW)/Deep Vein Thrombosis (DVT) Prevention:  Goal: Patient will participate in Venous Thrombosis (VTE)/Deep Vein Thrombosis (DVT)Prevention Measures  SCDs in place as a preventative. Will ensure these are worn.     Problem: Knowledge Deficit  Goal: Knowledge of disease process/condition, treatment plan, diagnostic tests, and medications will improve  POC discussed. Pt eager for stein removal. Will page MD for removal order. Will keep pt updated.

## 2018-02-21 NOTE — PROGRESS NOTES
Pt would really like to have the stein catheter out. Dr. Madison Hector is on call for Dr. Reyes. Per Dr. Hector, catheter is staying in tonight and we can address that in the morning with Dr. Reyes. Pt notified.

## 2018-02-21 NOTE — PROGRESS NOTES
Date & Time:   2/21/2018   10:07 AM        Patient ID:             Name:             Venancio Alcaraz   YOB: 1955  Age:                 62 y.o.  male   MRN:               [id number]    ________________________________________________________________________    Reports pain around his incision with movement  +flatus  No bm  Tolerating clears  Interval Exam:       Vitals:    02/20/18 2000 02/21/18 0000 02/21/18 0400 02/21/18 0800   BP: 129/82 142/88 146/81 150/80   Pulse: 99 92 90 93   Resp: 18 16 16 18   Temp: 36.1 °C (96.9 °F) 36.9 °C (98.5 °F) 36.7 °C (98.1 °F) 37.7 °C (99.8 °F)   SpO2: 92% 93% 92% 98%   Weight:       Height:         Weight/BMI: Body mass index is 36.64 kg/m².  Pulse Oximetry: 98 %, O2 (LPM): 0, O2 Delivery: None (Room Air)    Intake/Output Summary (Last 24 hours) at 02/21/18 1007  Last data filed at 02/21/18 0800   Gross per 24 hour   Intake             2590 ml   Output             2975 ml   Net             -385 ml         Physical Exam  GENERAL:  Alert and oriented x 3. NAD, normal respiratory effort  CV: Regular rate and rhythm, no murmurs. Extremities warm no edema.   Lungs: Clear to auscultation bilaterally.    Abd: Soft, non-distended  Appropriately tender around incision.  Dressing dry    Recent Labs      02/20/18   0441  02/21/18   0316   SODIUM  135  136   POTASSIUM  4.0  4.0   CHLORIDE  99  104   CO2  22  25   BUN  25*  15   CREATININE  0.84  0.67   CALCIUM  10.0  8.5       Recent Labs      02/20/18   0441  02/21/18   0316   ALTSGPT  39   --    ASTSGOT  30   --    ALKPHOSPHAT  53   --    TBILIRUBIN  0.8   --    LIPASE  13   --    GLUCOSE  220*  128*       Recent Labs      02/20/18   0441 02/21/18   0316   RBC  5.24  4.88   HEMOGLOBIN  16.0  14.4   HEMATOCRIT  45.0  42.5   PLATELETCT  208  211   PROTHROMBTM  12.4   --    INR  0.95   --        Recent Labs      02/20/18 0441 02/21/18   0316   WBC  7.6  10.7   NEUTSPOLYS  67.20  71.90   LYMPHOCYTES  24.80  17.60*    MONOCYTES  6.20  9.80   EOSINOPHILS  0.80  0.10   BASOPHILS  0.50  0.20   ASTSGOT  30   --    ALTSGPT  39   --    ALKPHOSPHAT  53   --    TBILIRUBIN  0.8   --          ____    Advance diet  D/c stein  Ambulate

## 2018-02-22 PROCEDURE — 700102 HCHG RX REV CODE 250 W/ 637 OVERRIDE(OP): Performed by: SURGERY

## 2018-02-22 PROCEDURE — 770006 HCHG ROOM/CARE - MED/SURG/GYN SEMI*

## 2018-02-22 PROCEDURE — 700111 HCHG RX REV CODE 636 W/ 250 OVERRIDE (IP): Performed by: SURGERY

## 2018-02-22 PROCEDURE — A9270 NON-COVERED ITEM OR SERVICE: HCPCS | Performed by: SURGERY

## 2018-02-22 RX ORDER — OXYCODONE HYDROCHLORIDE AND ACETAMINOPHEN 5; 325 MG/1; MG/1
1-2 TABLET ORAL EVERY 6 HOURS PRN
Qty: 40 TAB | Refills: 0 | Status: SHIPPED | OUTPATIENT
Start: 2018-02-22 | End: 2018-02-27

## 2018-02-22 RX ADMIN — LISINOPRIL 20 MG: 20 TABLET ORAL at 09:31

## 2018-02-22 RX ADMIN — OXYCODONE HYDROCHLORIDE 5 MG: 5 TABLET ORAL at 12:19

## 2018-02-22 RX ADMIN — ENOXAPARIN SODIUM 40 MG: 100 INJECTION SUBCUTANEOUS at 09:31

## 2018-02-22 RX ADMIN — STANDARDIZED SENNA CONCENTRATE AND DOCUSATE SODIUM 2 TABLET: 8.6; 5 TABLET, FILM COATED ORAL at 09:37

## 2018-02-22 RX ADMIN — OXYCODONE HYDROCHLORIDE 5 MG: 5 TABLET ORAL at 17:23

## 2018-02-22 RX ADMIN — OXYCODONE HYDROCHLORIDE 5 MG: 5 TABLET ORAL at 20:26

## 2018-02-22 RX ADMIN — OMEPRAZOLE 40 MG: 20 CAPSULE, DELAYED RELEASE ORAL at 09:31

## 2018-02-22 RX ADMIN — OXYCODONE HYDROCHLORIDE 2.5 MG: 5 TABLET ORAL at 06:05

## 2018-02-22 RX ADMIN — STANDARDIZED SENNA CONCENTRATE AND DOCUSATE SODIUM 2 TABLET: 8.6; 5 TABLET, FILM COATED ORAL at 20:26

## 2018-02-22 ASSESSMENT — PAIN SCALES - GENERAL
PAINLEVEL_OUTOF10: 7
PAINLEVEL_OUTOF10: 0
PAINLEVEL_OUTOF10: 4
PAINLEVEL_OUTOF10: ASSUMED PAIN PRESENT
PAINLEVEL_OUTOF10: 6
PAINLEVEL_OUTOF10: 5

## 2018-02-22 ASSESSMENT — PATIENT HEALTH QUESTIONNAIRE - PHQ9
SUM OF ALL RESPONSES TO PHQ9 QUESTIONS 1 AND 2: 0
2. FEELING DOWN, DEPRESSED, IRRITABLE, OR HOPELESS: NOT AT ALL
1. LITTLE INTEREST OR PLEASURE IN DOING THINGS: NOT AT ALL
SUM OF ALL RESPONSES TO PHQ QUESTIONS 1-9: 0
SUM OF ALL RESPONSES TO PHQ QUESTIONS 1-9: 0
1. LITTLE INTEREST OR PLEASURE IN DOING THINGS: NOT AT ALL
SUM OF ALL RESPONSES TO PHQ9 QUESTIONS 1 AND 2: 0
2. FEELING DOWN, DEPRESSED, IRRITABLE, OR HOPELESS: NOT AT ALL

## 2018-02-22 ASSESSMENT — LIFESTYLE VARIABLES: DO YOU DRINK ALCOHOL: NO

## 2018-02-22 NOTE — PROGRESS NOTES
Report received. Assumed care. Pt was sitting in chair. A/O x4. VSS. Responds appropriately. Pt reports tingling sensation on right lower abdomen when laying down after ambulation.  Dressing was observed, small amount of blood observed.  Pt reports pain of 2/10 when coughing.  Patient was advised to splint surgery site with pillow will coughing.  Pt denies SOB.  Pt has not had a bowel movement or passed gas since his surgery. Assessment complete. Discussed POC, , pt verbalizes understanding. Explained importance of calling before getting OOB. Call light and belongings within reach. Bed alarm on. Bed in the lowest position. Treaded socks in place. Hourly rounding in progress. Will continue to monitor .

## 2018-02-22 NOTE — PROGRESS NOTES
Date & Time:   2/22/2018   12:55 PM        Patient ID:             Name:             Venancio Alcaraz   YOB: 1955  Age:                 62 y.o.  male   MRN:               [id number]    ________________________________________________________________________      POD#2  Patient is tolerating regular diet  No n/v  No flatus or bm yet  ambulating     Interval Exam:       Vitals:    02/21/18 1600 02/21/18 1950 02/22/18 0242 02/22/18 0842   BP: (!) 166/95 146/88 143/100 129/88   Pulse: (!) 108 99 86 (!) 105   Resp: 20 16 14 16   Temp: 36.4 °C (97.5 °F) 36.9 °C (98.4 °F) 36.8 °C (98.2 °F) 37.1 °C (98.8 °F)   SpO2: 93% 95% 94% 94%   Weight:       Height:         Weight/BMI: Body mass index is 36.64 kg/m².  Pulse Oximetry: 94 %, O2 (LPM): 0, O2 Delivery: None (Room Air)    Intake/Output Summary (Last 24 hours) at 02/22/18 1255  Last data filed at 02/22/18 0900   Gross per 24 hour   Intake             1236 ml   Output             1425 ml   Net             -189 ml         Physical Exam  GENERAL:  Alert and oriented x 3. NAD, normal respiratory effort  CV: Regular rate and rhythm, no murmurs. Extremities warm no edema.   Lungs: Clear to auscultation bilaterally.    Abd: Soft, non-distended  Tender around midline incision  Dressing dry    Recent Labs      02/20/18   0441  02/21/18   0316   SODIUM  135  136   POTASSIUM  4.0  4.0   CHLORIDE  99  104   CO2  22  25   BUN  25*  15   CREATININE  0.84  0.67   CALCIUM  10.0  8.5       Recent Labs      02/20/18   0441  02/21/18   0316   ALTSGPT  39   --    ASTSGOT  30   --    ALKPHOSPHAT  53   --    TBILIRUBIN  0.8   --    LIPASE  13   --    GLUCOSE  220*  128*       Recent Labs      02/20/18   0441  02/21/18   0316   RBC  5.24  4.88   HEMOGLOBIN  16.0  14.4   HEMATOCRIT  45.0  42.5   PLATELETCT  208  211   PROTHROMBTM  12.4   --    INR  0.95   --        Recent Labs      02/20/18   0441  02/21/18   0316   WBC  7.6  10.7   NEUTSPOLYS  67.20  71.90   LYMPHOCYTES  24.80   17.60*   MONOCYTES  6.20  9.80   EOSINOPHILS  0.80  0.10   BASOPHILS  0.50  0.20   ASTSGOT  30   --    ALTSGPT  39   --    ALKPHOSPHAT  53   --    TBILIRUBIN  0.8   --          ________________________________________________________________________     Current Assessment and Plan:    plan to d/c patient after he has bm  F/u in office next week to have staples removed.

## 2018-02-22 NOTE — FACE TO FACE
Face to Face Note  -  Durable Medical Equipment    Minna Reyes M.D. - NPI: [id number]  I certify that this patient is under my care and that they had a durable medical equipment(DME)face to face encounter by myself that meets the physician DME face-to-face encounter requirements with this patient on:    Date of encounter:   Patient:                    MRN:                       YOB: 2018  Venancio Alcaraz  [id number]  1955     The encounter with the patient was in whole, or in part, for the following medical condition, which is the primary reason for durable medical equipment:  Post-Op Surgery    I certify that, based on my findings, the following durable medical equipment is medically necessary:  Walkers.    HOME O2 Saturation Measurements:(Values must be present for Home Oxygen orders)         ,     ,         My Clinical findings support the need for the above equipment due to:  Abnormal Gait    Supporting Symptoms:

## 2018-02-23 VITALS
OXYGEN SATURATION: 94 % | HEIGHT: 66 IN | SYSTOLIC BLOOD PRESSURE: 125 MMHG | HEART RATE: 91 BPM | TEMPERATURE: 97.8 F | WEIGHT: 227 LBS | BODY MASS INDEX: 36.48 KG/M2 | RESPIRATION RATE: 18 BRPM | DIASTOLIC BLOOD PRESSURE: 83 MMHG

## 2018-02-23 PROCEDURE — 700111 HCHG RX REV CODE 636 W/ 250 OVERRIDE (IP): Performed by: SURGERY

## 2018-02-23 PROCEDURE — 700102 HCHG RX REV CODE 250 W/ 637 OVERRIDE(OP): Performed by: SURGERY

## 2018-02-23 PROCEDURE — A9270 NON-COVERED ITEM OR SERVICE: HCPCS | Performed by: SURGERY

## 2018-02-23 RX ADMIN — MAGNESIUM HYDROXIDE 30 ML: 400 SUSPENSION ORAL at 14:21

## 2018-02-23 RX ADMIN — OXYCODONE HYDROCHLORIDE 2.5 MG: 5 TABLET ORAL at 08:11

## 2018-02-23 RX ADMIN — ENOXAPARIN SODIUM 40 MG: 100 INJECTION SUBCUTANEOUS at 08:10

## 2018-02-23 RX ADMIN — OXYCODONE HYDROCHLORIDE 5 MG: 5 TABLET ORAL at 18:52

## 2018-02-23 RX ADMIN — MAGNESIUM HYDROXIDE 30 ML: 400 SUSPENSION ORAL at 08:10

## 2018-02-23 RX ADMIN — STANDARDIZED SENNA CONCENTRATE AND DOCUSATE SODIUM 2 TABLET: 8.6; 5 TABLET, FILM COATED ORAL at 08:10

## 2018-02-23 RX ADMIN — LISINOPRIL 20 MG: 20 TABLET ORAL at 08:11

## 2018-02-23 RX ADMIN — OMEPRAZOLE 40 MG: 20 CAPSULE, DELAYED RELEASE ORAL at 08:11

## 2018-02-23 RX ADMIN — ONDANSETRON 4 MG: 4 TABLET, ORALLY DISINTEGRATING ORAL at 12:01

## 2018-02-23 ASSESSMENT — PAIN SCALES - GENERAL
PAINLEVEL_OUTOF10: 5
PAINLEVEL_OUTOF10: 5
PAINLEVEL_OUTOF10: 2

## 2018-02-23 NOTE — PROGRESS NOTES
Date & Time:   2/23/2018   9:40 AM        Patient ID:             Name:             Venancio Alcaraz   YOB: 1955  Age:                 62 y.o.  male   MRN:               [id number]    ________________________________________________________________________    Patient is tolerating regular diet  +flatus   Ambulating  No n/v       Interval Exam:       Vitals:    02/22/18 1907 02/22/18 1915 02/23/18 0400 02/23/18 0800   BP: 138/91  139/87 131/81   Pulse: (!) 107  94 (!) 104   Resp: 16  16 18   Temp: 35.9 °C (96.6 °F) 36.9 °C (98.4 °F) 36.1 °C (97 °F) 35.9 °C (96.6 °F)   SpO2: 92%  91% 94%   Weight:       Height:         Weight/BMI: Body mass index is 36.64 kg/m².  Pulse Oximetry: 94 %, O2 (LPM): 0, O2 Delivery: None (Room Air)    Intake/Output Summary (Last 24 hours) at 02/23/18 0940  Last data filed at 02/23/18 0400   Gross per 24 hour   Intake                0 ml   Output              475 ml   Net             -475 ml         Physical Exam  GENERAL:  Alert and oriented x 3. NAD, normal respiratory effort  CV: Regular rate and rhythm, no murmurs. Extremities warm no edema.   Lungs: Clear to auscultation bilaterally.    Abd: Soft, Non-tender, non-distended. Incision c/d/i    Recent Labs      02/21/18   0316   SODIUM  136   POTASSIUM  4.0   CHLORIDE  104   CO2  25   BUN  15   CREATININE  0.67   CALCIUM  8.5       Recent Labs      02/21/18   0316   GLUCOSE  128*       Recent Labs      02/21/18   0316   RBC  4.88   HEMOGLOBIN  14.4   HEMATOCRIT  42.5   PLATELETCT  211       Recent Labs      02/21/18   0316   WBC  10.7   NEUTSPOLYS  71.90   LYMPHOCYTES  17.60*   MONOCYTES  9.80   EOSINOPHILS  0.10   BASOPHILS  0.20         ________________________________________________________________________     Current Assessment and Plan:     Patient is doing well  Plan to d/c after he has a bowel movement.  ambulate

## 2018-02-23 NOTE — CARE PLAN
Problem: Safety  Goal: Will remain free from falls  Pt provided with education regarding fall risk and prevention.  Pt was advised to utilize call lights prior to mobilization and ambulation.  Hourly rounding instituted and pain was appropriately managed    Problem: Pain Management  Goal: Pain level will decrease to patient's comfort goal  Pt's pain levels were routinely assessed every 2 hrs.  Patient appropriately medicated for pain per mar.  Pt also educated on non-pharmacological techniques to manage pain

## 2018-02-23 NOTE — PROGRESS NOTES
"Report received. Assumed care. Pt was laying in bed awake. A/O x4. VSS. Responds appropriately. Pt reports he no longer feels tingling sensation on right lower abdomen.  Pt reports slight itching on incision site, refuses antihistamine.  Dressing was observed, small amount of blood observed.  Pt reports pain of 4/10 when coughing but tolerates well.  Patient was advised to splint surgery site with pillow will coughing.  Pt denies SOB.  Pt reports passing 'gas\" 4x last night, and was given milk of mag. Assessment complete. Discussed POC, , pt verbalizes understanding. Explained importance of calling before getting OOB. Call light and belongings within reach. Bed alarm on. Bed in the lowest position. Treaded socks in place. Hourly rounding in progress. Will continue to monitor .  "

## 2018-02-23 NOTE — DISCHARGE SUMMARY
DATE OF ADMISSION:  02/20/2018    DATE OF DISCHARGE:  02/22/2018    ADMITTING DIAGNOSIS:  Incarcerated umbilical hernia with bowel obstruction.    DISCHARGE DIAGNOSIS:  Incarcerated umbilical hernia with bowel obstruction.    SURGERIES:  Exploratory laparotomy, reduction of small bowel incarcerated   hernia and primary closure.    SURGEON:  Minna Reyes MD    HISTORY AND HOSPITAL COURSE:  Patient is a 62-year-old male with a   longstanding history of a large umbilical hernia.  He came in with 1-day   history of pain, severe nausea, vomiting, and the hernia was unable to be   reduced in the emergency department.  A CT abdomen and pelvis confirms small   bowel loops incarcerated within the hernia.  He was taken emergently to the   operating room for exploratory laparotomy.  Intraoperatively, _____ was found   to be congested, but viable, it was reduced.  The patient's umbilical skin was   excised because it was showing signs of early ischemia and then incision   along with the hernia defect which was approximately 4 cm was closed primarily   with interrupted 1-0 Vicryl.  The patient's postoperative course was   uneventful.  On postop day #2, he is tolerating regular diet.  He is passing   gas, having bowel movements.  He is ambulating.  The pain is controlled with   oral medications.  On exam, his abdomen is soft, nondistended.  He is tender   around his incision only and his incision is clean, dry, and intact.    DISCHARGE PLAN:  Plan is to discharge him home today, regular diet and   instructed to take stool softeners as needed for constipation.  He will be   given a prescription for Percocet.  Instructed to keep the incision as dry as   possible, but occasional shower.  No lifting over 20 pounds x6 weeks and   follow up in my office next week for staple removal.       ____________________________________     Minna Reyes MD    AEP / NTS    DD:  02/22/2018 12:43:09  DT:  02/22/2018 21:44:30    D#:  [id number]  Job#:   [id number]

## 2018-02-23 NOTE — CARE PLAN
Problem: Knowledge Deficit  Goal: Knowledge of the prescribed therapeutic regimen will improve  Provided pt and family education regarding side effects of narcotic pain medication use; particularly constipation.  Educated both parties on interventions to minimize constipation such as increased fiber and fluid intake, frequent ambulation, and use of stool softeners.    Problem: Pain Management  Goal: Pain level will decrease to patient's comfort goal  Pain level assessed routinely q 2 hrs.  Medicated per MAR routinely to proactively manage pain.  Educated pt splinting incision site when coughing to minimize pain.

## 2018-02-24 NOTE — PROGRESS NOTES
Pt discharge home via wheelchair with wife. Discharge instructions given to pt, pt verbalized understanding. Pt has follow up appointment with MD. Prescriptions given to pt. Consent for opiate use signed. All questions and concerns answered. No further needs noted.

## 2018-02-24 NOTE — DISCHARGE INSTRUCTIONS
Discharge Instructions    Discharged to home  by car with relative. Discharged via wheelchair, hospital escort: Yes.  Special equipment needed: Walker    Be sure to schedule a follow-up appointment with your primary care doctor or any specialists as instructed.     Discharge Plan:   Influenza Vaccine Indication: Not indicated: Previously immunized this influenza season and > 8 years of age    I understand that a diet low in cholesterol, fat, and sodium is recommended for good health. Unless I have been given specific instructions below for another diet, I accept this instruction as my diet prescription.   Other diet: Soft-Food Meal Plan  A soft-food meal plan includes foods that are safe and easy to swallow. This meal plan typically is used:  · If you are having trouble chewing or swallowing foods.  · As a transition meal plan after only having had liquid meals for a long period.  WHAT DO I NEED TO KNOW ABOUT THE SOFT-FOOD MEAL PLAN?  A soft-food meal plan includes tender foods that are soft and easy to chew and swallow. In most cases, bite-sized pieces of food are easier to swallow. A bite-sized piece is about ½ inch or smaller. Foods in this plan do not need to be ground or pureed.  Foods that are very hard, crunchy, or sticky should be avoided. Also, breads, cereals, yogurts, and desserts with nuts, seeds, or fruits should be avoided.  WHAT FOODS CAN I EAT?  Grains  Rice and wild rice. Moist bread, dressing, pasta, and noodles. Well-moistened dry or cooked cereals, such as farina (cooked wheat cereal), oatmeal, or grits. Biscuits, breads, muffins, pancakes, and waffles that have been well moistened.  Vegetables  Shredded lettuce. Cooked, tender vegetables, including potatoes without skins. Vegetable juices. Broths or creamed soups made with vegetables that are not stringy or chewy. Strained tomatoes (without seeds).  Fruits  Canned or well-cooked fruits. Soft (ripe), peeled fresh fruits, such as peaches,  nectarines, kiwi, cantaloupe, honeydew melon, and watermelon (without seeds). Soft berries with small seeds, such as strawberries. Fruit juices (without pulp).  Meats and Other Protein Sources  Moist, tender, lean beef. Mutton. Lamb. Veal. Chicken. Turkey. Liver. Ham. Fish without bones. Eggs.  Dairy  Milk, milk drinks, and cream. Plain cream cheese and cottage cheese. Plain yogurt.  Sweets/Desserts  Flavored gelatin desserts. Custard. Plain ice cream, frozen yogurt, sherbet, milk shakes, and malts. Plain cakes and cookies. Plain hard candy.   Other  Butter, margarine (without trans fat), and cooking oils. Mayonnaise. Cream sauces. Mild spices, salt, and sugar. Syrup, molasses, honey, and jelly.  The items listed above may not be a complete list of recommended foods or beverages. Contact your dietitian for more options.  WHAT FOODS ARE NOT RECOMMENDED?  Grains  Dry bread, toast, crackers that have not been moistened. Coarse or dry cereals, such as bran, granola, and shredded wheat. Tough or chewy crusty breads, such as Vietnamese bread or baguettes.  Vegetables  Corn. Raw vegetables except shredded lettuce. Cooked vegetables that are tough or stringy. Tough, crisp, fried potatoes and potato skins.  Fruits  Fresh fruits with skins or seeds or both, such as apples, pears, or grapes. Stringy, high-pulp fruits, such as papaya, pineapple, coconut, or lyubov. Fruit leather, fruit roll-ups, and all dried fruits.  Meats and Other Protein Sources  Sausages and hot dogs. Meats with gristle. Fish with bones. Nuts, seeds, and chunky peanut or other nut butters.  Sweets/Desserts  Cakes or cookies that are very dry or chewy.   The items listed above may not be a complete list of foods and beverages to avoid. Contact your dietitian for more information.     This information is not intended to replace advice given to you by your health care provider. Make sure you discuss any questions you have with your health care provider.      Document Released: 03/26/2009 Document Revised: 12/23/2014 Document Reviewed: 11/14/2014  [employer] Interactive Patient Education ©2016 [employer]        Special Instructions:     Ok To Shower, keep incisions as dry as possible, do not submerge.   Remove dressing two days after surgery   No strenuous activity of lifting >15 lbs for six weeks.   Follow up with Dr. Reyes in office in 10-14 days    · Is patient discharged on Warfarin / Coumadin?   No     Depression / Suicide Risk    As you are discharged from this [facility] facility, it is important to learn how to keep safe from harming yourself.    Recognize the warning signs:  · Abrupt changes in personality, positive or negative- including increase in energy   · Giving away possessions  · Change in eating patterns- significant weight changes-  positive or negative  · Change in sleeping patterns- unable to sleep or sleeping all the time   · Unwillingness or inability to communicate  · Depression  · Unusual sadness, discouragement and loneliness  · Talk of wanting to die  · Neglect of personal appearance   · Rebelliousness- reckless behavior  · Withdrawal from people/activities they love  · Confusion- inability to concentrate     If you or a loved one observes any of these behaviors or has concerns about self-harm, here's what you can do:  · Talk about it- your feelings and reasons for harming yourself  · Remove any means that you might use to hurt yourself (examples: pills, rope, extension cords, firearm)  · Get professional help from the community (Mental Health, Substance Abuse, psychological counseling)  · Do not be alone:Call your Safe Contact- someone whom you trust who will be there for you.  · Call your local CRISIS HOTLINE 717-7007 or 837-652-2975  · Call your local Children's Mobile Crisis Response Team [place] (719) 234-3203 or www.[employer]  · Call the toll free National Suicide Prevention Hotlines   · National Suicide Prevention  [employer] 659-379-SPPC (1533)  · [facility] 800-SUICIDE (750-6446)    Acetaminophen; Oxycodone tablets  What is this medicine?  ACETAMINOPHEN; OXYCODONE (a set a KARMA chase fen; ox i KOE done) is a pain reliever. It is used to treat mild to moderate pain.  This medicine may be used for other purposes; ask your health care provider or pharmacist if you have questions.  COMMON BRAND NAME(S): Endocet, Magnacet, Narvox, Percocet, Perloxx, Primalev, Primlev, Roxicet, Xolox  What should I tell my health care provider before I take this medicine?  They need to know if you have any of these conditions:  -brain tumor  -Crohn's disease, inflammatory bowel disease, or ulcerative colitis  -drink more than 3 alcohol containing drinks per day  -drug abuse or addiction  -head injury  -heart or circulation problems  -kidney disease or problems going to the bathroom  -liver disease  -lung disease, asthma, or breathing problems  -an unusual or allergic reaction to acetaminophen, oxycodone, other opioid analgesics, other medicines, foods, dyes, or preservatives  -pregnant or trying to get pregnant  -breast-feeding  How should I use this medicine?  Take this medicine by mouth with a full glass of water. Follow the directions on the prescription label. Take your medicine at regular intervals. Do not take your medicine more often than directed.  Talk to your pediatrician regarding the use of this medicine in children. Special care may be needed.  Patients over 65 years old may have a stronger reaction and need a smaller dose.  Overdosage: If you think you have taken too much of this medicine contact a poison control center or emergency room at once.  NOTE: This medicine is only for you. Do not share this medicine with others.  What if I miss a dose?  If you miss a dose, take it as soon as you can. If it is almost time for your next dose, take only that dose. Do not take double or extra doses.  What may interact with this  medicine?  -alcohol  -antihistamines  -barbiturates like amobarbital, butalbital, butabarbital, methohexital, pentobarbital, phenobarbital, thiopental, and secobarbital  -benztropine  -drugs for bladder problems like solifenacin, trospium, oxybutynin, tolterodine, hyoscyamine, and methscopolamine  -drugs for breathing problems like ipratropium and tiotropium  -drugs for certain stomach or intestine problems like propantheline, homatropine methylbromide, glycopyrrolate, atropine, belladonna, and dicyclomine  -general anesthetics like etomidate, ketamine, nitrous oxide, propofol, desflurane, enflurane, halothane, isoflurane, and sevoflurane  -medicines for depression, anxiety, or psychotic disturbances  -medicines for sleep  -muscle relaxants  -naltrexone  -narcotic medicines (opiates) for pain  -phenothiazines like perphenazine, thioridazine, chlorpromazine, mesoridazine, fluphenazine, prochlorperazine, promazine, and trifluoperazine  -scopolamine  -tramadol  -trihexyphenidyl  This list may not describe all possible interactions. Give your health care provider a list of all the medicines, herbs, non-prescription drugs, or dietary supplements you use. Also tell them if you smoke, drink alcohol, or use illegal drugs. Some items may interact with your medicine.  What should I watch for while using this medicine?  Tell your doctor or health care professional if your pain does not go away, if it gets worse, or if you have new or a different type of pain. You may develop tolerance to the medicine. Tolerance means that you will need a higher dose of the medication for pain relief. Tolerance is normal and is expected if you take this medicine for a long time.  Do not suddenly stop taking your medicine because you may develop a severe reaction. Your body becomes used to the medicine. This does NOT mean you are addicted. Addiction is a behavior related to getting and using a drug for a non-medical reason. If you have pain, you  have a medical reason to take pain medicine. Your doctor will tell you how much medicine to take. If your doctor wants you to stop the medicine, the dose will be slowly lowered over time to avoid any side effects.  You may get drowsy or dizzy. Do not drive, use machinery, or do anything that needs mental alertness until you know how this medicine affects you. Do not stand or sit up quickly, especially if you are an older patient. This reduces the risk of dizzy or fainting spells. Alcohol may interfere with the effect of this medicine. Avoid alcoholic drinks.  There are different types of narcotic medicines (opiates) for pain. If you take more than one type at the same time, you may have more side effects. Give your health care provider a list of all medicines you use. Your doctor will tell you how much medicine to take. Do not take more medicine than directed. Call emergency for help if you have problems breathing.  The medicine will cause constipation. Try to have a bowel movement at least every 2 to 3 days. If you do not have a bowel movement for 3 days, call your doctor or health care professional.  Do not take Tylenol (acetaminophen) or medicines that have acetaminophen with this medicine. Too much acetaminophen can be very dangerous. Many nonprescription medicines contain acetaminophen. Always read the labels carefully to avoid taking more acetaminophen.  What side effects may I notice from receiving this medicine?  Side effects that you should report to your doctor or health care professional as soon as possible:  -allergic reactions like skin rash, itching or hives, swelling of the face, lips, or tongue  -breathing difficulties, wheezing  -confusion  -light headedness or fainting spells  -severe stomach pain  -yellowing of the skin or the whites of the eyes  Side effects that usually do not require medical attention (report to your doctor or health care professional if they continue or are  bothersome):  -dizziness  -drowsiness  -nausea  -vomiting  This list may not describe all possible side effects. Call your doctor for medical advice about side effects. You may report side effects to FDA at 1-180-FDA-2251.  Where should I keep my medicine?  Keep out of the reach of children. This medicine can be abused. Keep your medicine in a safe place to protect it from theft. Do not share this medicine with anyone. Selling or giving away this medicine is dangerous and against the law.  Store at room temperature between 20 and 25 degrees C (68 and 77 degrees F). Keep container tightly closed. Protect from light.   Discard unused medicine and used packaging carefully. Pets and children can be harmed if they find used or lost packages. Flush any unused medicines down the toilet. Do not use the medicine after the expiration date.  NOTE: This sheet is a summary. It may not cover all possible information. If you have questions about this medicine, talk to your doctor, pharmacist, or health care provider.  © 2014, [employer]. (8/28/2012 4:13:41 PM)      Small Bowel Obstruction  A small bowel obstruction is a blockage (obstruction) of the small intestine (small bowel). The small bowel is a long, slender tube that connects the stomach to the colon. Its job is to absorb nutrients from the fluids and foods you consume into the bloodstream.   CAUSES   There are many causes of intestinal blockage. The most common ones include:  · Hernias. This is a more common cause in children than adults.  · Inflammatory bowel disease (enteritis and colitis).  · Twisting of the bowel (volvulus).  · Tumors.  · Scar tissue (adhesions) from previous surgery or radiation treatment.  · Recent surgery. This may cause an acute small bowel obstruction called an ileus.  SYMPTOMS   · Abdominal pain. This may be dull cramps or sharp pain. It may occur in one area or may be present in the entire abdomen. Pain can range from mild to severe,  depending on the degree of obstruction.  · Nausea and vomiting. Vomit may be greenish or yellow bile color.  · Distended or swollen stomach. Abdominal bloating is a common symptom.  · Constipation.  · Lack of passing gas.  · Frequent belching.  · Diarrhea. This may occur if runny stool is able to leak around the obstruction.  DIAGNOSIS   Your caregiver can usually diagnose small bowel obstruction by taking a history, doing a physical exam, and taking X-rays. If the cause is unclear, a CT scan (computerized tomography) of your abdomen and pelvis may be needed.  TREATMENT   Treatment of the blockage depends on the cause and how bad the problem is.   · Sometimes, the obstruction improves with bed rest and intravenous (IV) fluids.  · Resting the bowel is very important. This means following a simple diet. Sometimes, a clear liquid diet may be required for several days.  · Sometimes, a small tube (nasogastric tube) is placed into the stomach to decompress the bowel. When the bowel is blocked, it usually swells up like a balloon filled with air and fluids. Decompression means that the air and fluids are removed by suction through that tube. This can help with pain, discomfort, and nausea. It can also help the obstruction resolve faster.  · Surgery may be required if other treatments do not work. Bowel obstruction from a hernia may require early surgery and can be an emergency procedure. Adhesions that cause frequent or severe obstructions may also require surgery.  HOME CARE INSTRUCTIONS  If your bowel obstruction is only partial or incomplete, you may be allowed to go home.  · Get plenty of rest.  · Follow your diet as directed by your caregiver.  · Only consume clear liquids until your condition improves.  · Avoid solid foods as instructed.  SEEK IMMEDIATE MEDICAL CARE IF:  · You have increased pain or cramping.  · You vomit blood.  · You have uncontrolled vomiting or nausea.  · You cannot drink fluids due to vomiting  or pain.  · You develop confusion.  · You begin feeling very dry or thirsty (dehydrated).  · You have severe bloating.  · You have chills.  · You have a fever.  · You feel extremely weak or you faint.  MAKE SURE YOU:  · Understand these instructions.  · Will watch your condition.  · Will get help right away if you are not doing well or get worse.     This information is not intended to replace advice given to you by your health care provider. Make sure you discuss any questions you have with your health care provider.     Document Released: 03/06/2007 Document Revised: 03/11/2013 Document Reviewed: 02/11/2016  [employer] Interactive Patient Education ©2016 [employer].

## 2018-02-25 LAB
BACTERIA BLD CULT: NORMAL
BACTERIA BLD CULT: NORMAL
SIGNIFICANT IND 70042: NORMAL
SIGNIFICANT IND 70042: NORMAL
SITE SITE: NORMAL
SITE SITE: NORMAL
SOURCE SOURCE: NORMAL
SOURCE SOURCE: NORMAL

## 2018-03-26 NOTE — DISCHARGE SUMMARY
DATE OF ADMISSION:  02/20/2018    DATE OF DISCHARGE:  02/23/2018    ADMITTING DIAGNOSIS:  Incarcerated umbilical hernia with bowel obstruction.    DISCHARGE DIAGNOSIS:  Incarcerated umbilical hernia with bowel obstruction.    SURGERIES:  Exploratory laparotomy, reduction of small bowel incarcerated   hernia and primary closure.    SURGEON:  Minna Reyes MD    HISTORY AND HOSPITAL COURSE:  Patient is a 62-year-old male with a   longstanding history of a large umbilical hernia.  He came in with 1-day   history of pain, severe nausea, vomiting, and the hernia was unable to be   reduced in the emergency department.  A CT abdomen and pelvis confirms small   bowel loops incarcerated within the hernia.  He was taken emergently to the   operating room for exploratory laparotomy.  Intraoperatively, the small bowel   was found to be congested, but viable, it was reduced.  The patient's   umbilical skin was excised because it was showing signs of early ischemia and   then incision along with the hernia defect which was approximately 4 cm was   closed primarily with interrupted 1-0 Vicryl.  The patient's postoperative   course was uneventful.  On postop day #2, he is tolerating regular diet.  He   is passing gas, having bowel movements.  He is ambulating.  The pain is   controlled with oral medications.  On exam, his abdomen is soft, nondistended.    He is tender around his incision only and his incision is clean, dry, and   intact.    DISCHARGE PLAN:  Plan is to discharge him home today, regular diet and   instructed to take stool softeners as needed for constipation.  He will be   given a prescription for Percocet.  Instructed to keep the incision as dry as   possible, but occasional shower.  No lifting over 20 pounds x6 weeks and   follow up in my office next week for staple removal.       ____________________________________     Minna Reyes MD    AEP / N[facility]    DD:  02/22/2018 12:43:09  DT:  02/22/2018 21:44:30    D#:   [id number]  Job#:  [id number]

## 2018-05-18 ENCOUNTER — APPOINTMENT (OUTPATIENT)
Dept: URGENT CARE | Facility: CLINIC | Age: 63
End: 2018-05-18
Payer: COMMERCIAL

## 2018-06-17 ENCOUNTER — OFFICE VISIT (OUTPATIENT)
Dept: URGENT CARE | Facility: PHYSICIAN GROUP | Age: 63
End: 2018-06-17
Payer: COMMERCIAL

## 2018-06-17 VITALS
BODY MASS INDEX: 33.91 KG/M2 | WEIGHT: 211 LBS | RESPIRATION RATE: 16 BRPM | TEMPERATURE: 97.3 F | OXYGEN SATURATION: 97 % | HEIGHT: 66 IN | HEART RATE: 76 BPM | SYSTOLIC BLOOD PRESSURE: 140 MMHG | DIASTOLIC BLOOD PRESSURE: 86 MMHG

## 2018-06-17 DIAGNOSIS — J02.9 VIRAL PHARYNGITIS: ICD-10-CM

## 2018-06-17 LAB
INT CON NEG: NEGATIVE
INT CON POS: POSITIVE
S PYO AG THROAT QL: NEGATIVE

## 2018-06-17 PROCEDURE — 87880 STREP A ASSAY W/OPTIC: CPT | Performed by: FAMILY MEDICINE

## 2018-06-17 PROCEDURE — 99204 OFFICE O/P NEW MOD 45 MIN: CPT | Performed by: FAMILY MEDICINE

## 2018-06-17 RX ORDER — FEXOFENADINE HCL 180 MG/1
180 TABLET ORAL DAILY
Qty: 30 TAB | Refills: 0 | Status: SHIPPED | OUTPATIENT
Start: 2018-06-17

## 2018-06-17 RX ORDER — FLUTICASONE PROPIONATE 50 MCG
1 SPRAY, SUSPENSION (ML) NASAL 2 TIMES DAILY
Qty: 1 BOTTLE | Refills: 0 | Status: SHIPPED | OUTPATIENT
Start: 2018-06-17

## 2018-06-17 ASSESSMENT — PAIN SCALES - GENERAL: PAINLEVEL: NO PAIN

## 2018-06-17 NOTE — PROGRESS NOTES
"Subjective:     CC:  presents with Pharyngitis            Pharyngitis   This is a new problem. The current episode started yesterday. The problem has been unchanged. There has been no fever. The pain is mild. Associated symptoms includes: runny nose . Pertinent negatives include no abdominal pain, congestion, coughing, diarrhea, headaches, shortness of breath or vomiting. no exposure to strep or mono.   has tried acetaminophen for the symptoms. The treatment provided mild relief.   + hx seasonal allergies    Social History   Substance Use Topics   • Smoking status: Former Smoker   • Smokeless tobacco: Never Used   • Alcohol use Yes      Comment: Rarely       Past Medical History:   Diagnosis Date   • Diabetes (HCC)    • Hypertension      Family history was reviewed and not pertinent     Review of Systems:  Review of Systems   Constitutional: Negative for fever, fatigue.   HENT: no neck pain, headache or dizziness  Eyes: denies vision changes or discharge  Respiratory: no cough,    SOB  Cardiovascular: denies palpations, chest pain   Gastrointestinal: denies diarrhea, abdominal pain or constipation.  No blood in stool.  Musculoskeletal: denies back pain or joint pain    Skin: no itching or rash  Neurological: No numbness or tingling.   Psych - denies depression, anxiety  10 point ROS otherwise negative, except per HPI         Objective:   Blood pressure 140/86, pulse 76, temperature 36.3 °C (97.3 °F), resp. rate 16, height 1.676 m (5' 6\"), weight 95.7 kg (211 lb), SpO2 97 %.          Physical Exam   Constitutional:   oriented to person, place, and time.  appears well-developed and well-nourished. No distress.   HENT:   Head: Normocephalic and atraumatic.   Right Ear: External ear normal.   Left Ear: External ear normal.   Nose: Mucosal edema present. Right sinus exhibits no maxillary sinus tenderness and no frontal sinus tenderness. Left sinus exhibits no maxillary sinus tenderness and no frontal sinus tenderness. "   Mouth/Throat: no posterior oropharyngeal exudate.   There is posterior oropharyngeal erythema present. No posterior oropharyngeal edema.   Tonsils 1+ bilaterally     Eyes: Conjunctivae and EOM are normal. Pupils are equal, round, and reactive to light. Right eye exhibits no discharge. Left eye exhibits no discharge. No scleral icterus.   Neck: Normal range of motion. Neck supple. No JVD present. No tracheal deviation present. No thyromegaly present.   Cardiovascular: Normal rate, regular rhythm, normal heart sounds and intact distal pulses.  Exam reveals no friction rub.    No murmur heard.  Pulmonary/Chest: Effort normal and breath sounds normal. No respiratory distress.   no wheezes.   no rales.    Musculoskeletal:  exhibits no edema.   Lymphadenopathy: no cervical LAD  Neurological:   alert and oriented to person, place, and time.   Skin: Skin is warm and dry. No erythema.   Psychiatric:   normal mood and affect.   Nursing note and vitals reviewed.       rapid strep neg        Assessment/Plan:     1. Viral pharyngitis  rx flonase    Follow up in one week if no improvement, sooner if symptoms worsen.     - POCT Rapid Strep A

## 2018-08-08 ENCOUNTER — NON-PROVIDER VISIT (OUTPATIENT)
Dept: URGENT CARE | Facility: CLINIC | Age: 63
End: 2018-08-08

## 2018-08-08 DIAGNOSIS — Z02.1 PRE-EMPLOYMENT DRUG SCREENING: ICD-10-CM

## 2018-08-08 LAB
AMP AMPHETAMINE: NORMAL
BAR BARBITURATES: NORMAL
BZO BENZODIAZEPINES: NORMAL
COC COCAINE: NORMAL
INT CON NEG: NORMAL
INT CON POS: NORMAL
MDMA ECSTASY: NORMAL
MET METHAMPHETAMINES: NORMAL
MTD METHADONE: NORMAL
OPI OPIATES: NORMAL
OXY OXYCODONE: NORMAL
PCP PHENCYCLIDINE: NORMAL
POC URINE DRUG SCREEN OCDRS: NEGATIVE
THC: NORMAL

## 2018-08-08 PROCEDURE — 80305 DRUG TEST PRSMV DIR OPT OBS: CPT | Performed by: PHYSICIAN ASSISTANT

## (undated) DEVICE — CHLORAPREP 26 ML APPLICATOR - ORANGE TINT(25/CA)

## (undated) DEVICE — KIT ROOM DECONTAMINATION

## (undated) DEVICE — SYRINGE 10 ML CONTROL LL (25EA/BX 4BX/CA)

## (undated) DEVICE — LACTATED RINGERS INJ 1000 ML - (14EA/CA 60CA/PF)

## (undated) DEVICE — TUBING CLEARLINK DUO-VENT - C-FLO (48EA/CA)

## (undated) DEVICE — HEAD HOLDER JUNIOR/ADULT

## (undated) DEVICE — SUTURE 1 VICRYL PLUS CT-1 - 18 INCH (12/BX)

## (undated) DEVICE — KIT ANESTHESIA W/CIRCUIT & 3/LT BAG W/FILTER (20EA/CA)

## (undated) DEVICE — ELECTRODE 850 FOAM ADHESIVE - HYDROGEL RADIOTRNSPRNT (50/PK)

## (undated) DEVICE — LIGASURE TISSUE FUSION  - SINGLE USE (6/CA)

## (undated) DEVICE — SUCTION INSTRUMENT YANKAUER BULBOUS TIP W/O VENT (50EA/CA)

## (undated) DEVICE — SLEEVE, VASO, THIGH, MED

## (undated) DEVICE — TAPE CLOTH MEDIPORE 6 INCH - (12RL/CA)

## (undated) DEVICE — BOVIE  BLADE 6 EXTENDED - (50/PK)

## (undated) DEVICE — TUBE CONNECT SUCTION CLEAR 120 X 1/4" (50EA/CA)"

## (undated) DEVICE — SPONGE GAUZESTER 4 X 4 4PLY - (128PK/CA)

## (undated) DEVICE — BLADE SURGICAL #15 - (50/BX 3BX/CA)

## (undated) DEVICE — SET LEADWIRE 5 LEAD BEDSIDE DISPOSABLE ECG (1SET OF 5/EA)

## (undated) DEVICE — NEPTUNE 4 PORT MANIFOLD - (20/PK)

## (undated) DEVICE — PROTECTOR ULNA NERVE - (36PR/CA)

## (undated) DEVICE — SENSOR SPO2 NEO LNCS ADHESIVE (20/BX) SEE USER NOTES

## (undated) DEVICE — SUTURE 0 COATED VICRYL 6-18IN - (12PK/BX)

## (undated) DEVICE — GLOVE BIOGEL PI INDICATOR SZ 7.0 SURGICAL PF LF - (50/BX 4BX/CA)

## (undated) DEVICE — SUTURE 3-0 VICRYL PLUS SH - 8X 18 INCH (12/BX)

## (undated) DEVICE — GLOVE SZ 6.5 BIOGEL PI MICRO - PF LF (50PR/BX)

## (undated) DEVICE — SUTURE 2-0 PDS II CT-2 - (36/BX)

## (undated) DEVICE — SET EXTENSION WITH 2 PORTS (48EA/CA) ***PART #2C8610 IS A SUBSTITUTE*****

## (undated) DEVICE — DRAPE LAPAROTOMY T SHEET - (12EA/CA)

## (undated) DEVICE — TRAY CATHETER FOLEY URINE METER W/STATLOCK 350ML (10EA/CA)

## (undated) DEVICE — CLIP MED INTNL HRZN TI ESCP - (25/BX)

## (undated) DEVICE — CANISTER SUCTION 3000ML MECHANICAL FILTER AUTO SHUTOFF MEDI-VAC NONSTERILE LF DISP  (40EA/CA)

## (undated) DEVICE — STAPLER SKIN DISP - (6/BX 10BX/CA) VISISTAT

## (undated) DEVICE — PACK MINOR BASIN - (2EA/CA)

## (undated) DEVICE — SODIUM CHL IRRIGATION 0.9% 1000ML (12EA/CA)

## (undated) DEVICE — SUTURE GENERAL

## (undated) DEVICE — GOWN SURGEONS LARGE - (32/CA)

## (undated) DEVICE — NEEDLE NON SAFETY HYPO 22 GA X 1 1/2 IN (100/BX)

## (undated) DEVICE — PACK MAJOR BASIN - (2EA/CA)

## (undated) DEVICE — BLADE SURGICAL CLIPPER - (50EA/CA)

## (undated) DEVICE — ELECTRODE DUAL RETURN W/ CORD - (50/PK)

## (undated) DEVICE — GOWN WARMING STANDARD FLEX - (30/CA)

## (undated) DEVICE — SUTURE 2-0 COATED VICRYL PLUS - 12 X 18 INCH (12/BX)

## (undated) DEVICE — MASK ANESTHESIA ADULT  - (100/CA)

## (undated) DEVICE — SUTURE 0 VICRYL PLUS CT-1 - 8 X 18 INCH (12/BX)